# Patient Record
Sex: MALE | Race: WHITE | Employment: FULL TIME | URBAN - METROPOLITAN AREA
[De-identification: names, ages, dates, MRNs, and addresses within clinical notes are randomized per-mention and may not be internally consistent; named-entity substitution may affect disease eponyms.]

---

## 2018-08-03 ENCOUNTER — THERAPY VISIT (OUTPATIENT)
Dept: PHYSICAL THERAPY | Facility: CLINIC | Age: 28
End: 2018-08-03
Payer: COMMERCIAL

## 2018-08-03 DIAGNOSIS — M22.2X9 PATELLOFEMORAL PAIN SYNDROME: ICD-10-CM

## 2018-08-03 DIAGNOSIS — M25.561 KNEE PAIN, RIGHT: Primary | ICD-10-CM

## 2018-08-03 PROCEDURE — 97110 THERAPEUTIC EXERCISES: CPT | Mod: GP | Performed by: PHYSICAL THERAPIST

## 2018-08-03 PROCEDURE — 97161 PT EVAL LOW COMPLEX 20 MIN: CPT | Mod: GP | Performed by: PHYSICAL THERAPIST

## 2018-08-03 PROCEDURE — 97530 THERAPEUTIC ACTIVITIES: CPT | Mod: GP | Performed by: PHYSICAL THERAPIST

## 2018-08-03 ASSESSMENT — ACTIVITIES OF DAILY LIVING (ADL)
GO DOWN STAIRS: ACTIVITY IS MINIMALLY DIFFICULT
STAND: ACTIVITY IS NOT DIFFICULT
KNEE_ACTIVITY_OF_DAILY_LIVING_SCORE: 66.16
GIVING WAY, BUCKLING OR SHIFTING OF KNEE: THE SYMPTOM AFFECTS MY ACTIVITY SEVERELY
PAIN: THE SYMPTOM AFFECTS MY ACTIVITY SEVERELY
KNEEL ON THE FRONT OF YOUR KNEE: ACTIVITY IS SOMEWHAT DIFFICULT
WALK: ACTIVITY IS NOT DIFFICULT
HOW_WOULD_YOU_RATE_THE_OVERALL_FUNCTION_OF_YOUR_KNEE_DURING_YOUR_USUAL_DAILY_ACTIVITIES?: ABNORMAL
RISE FROM A CHAIR: ACTIVITY IS MINIMALLY DIFFICULT
STIFFNESS: I HAVE THE SYMPTOM BUT IT DOES NOT AFFECT MY ACTIVITY
SWELLING: NOT ANSWERED
LIMPING: THE SYMPTOM AFFECTS MY ACTIVITY SLIGHTLY
HOW_WOULD_YOU_RATE_THE_CURRENT_FUNCTION_OF_YOUR_KNEE_DURING_YOUR_USUAL_DAILY_ACTIVITIES_ON_A_SCALE_FROM_0_TO_100_WITH_100_BEING_YOUR_LEVEL_OF_KNEE_FUNCTION_PRIOR_TO_YOUR_INJURY_AND_0_BEING_THE_INABILITY_TO_PERFORM_ANY_OF_YOUR_USUAL_DAILY_ACTIVITIES?: 70
WEAKNESS: THE SYMPTOM AFFECTS MY ACTIVITY SEVERELY
AS_A_RESULT_OF_YOUR_KNEE_INJURY,_HOW_WOULD_YOU_RATE_YOUR_CURRENT_LEVEL_OF_DAILY_ACTIVITY?: ABNORMAL
KNEE_ACTIVITY_OF_DAILY_LIVING_SUM: 43
RAW_SCORE: 46.31
SQUAT: ACTIVITY IS SOMEWHAT DIFFICULT
SIT WITH YOUR KNEE BENT: ACTIVITY IS NOT DIFFICULT
GO UP STAIRS: ACTIVITY IS MINIMALLY DIFFICULT

## 2018-08-03 NOTE — LETTER
[facility]  [address] [address]  [phone]    2018    Re: Angelo Gamboa   :   1990  MRN:  [id number]   REFERRING PHYSICIAN:   Colleen Nash    [facility] PH[facility]ICAL [facility]    Date of Initial Evaluation:  18  Visits:  Rxs Used: 1  Reason for Referral:     Knee pain, right  Patellofemoral pain syndrome                 Physical Therapy Initial Evaluation  August 3, 2018  Precautions/Restrictions/MD instructions: PT eval and treat. 5-6 session. Follow up after if . renzo frazier, pelvifemoral biomechanics.    Subjective:   Date of Onset: High School. Increase in recent months. MD order on 18  C/C: right anterior knee pain, clicking and popping  Quality of pain is aching and clicking/popping. Pains are described as intermittent in nature. Pain is worse: on the go. Pain is rated 7/10.   History of symptoms: Pains began gradually as the result of maybe from cutting in sports in high school. Since onset, symptoms are same. Previous episodes:recurrent  Knee pain   Worsened by: running, sitting cross legged, landing on the knee, jumping, cutting, squatting and deadlifting  Alleviated by: Nothing.    General health as reported by patient: good  Pertinent medical/surgical history: overweight. He denies night pain, painful cough, painful peripheral motor deficit, recent bowel/bladder change, recent vision change, ringing in the ears or pain with swallowing, unexplained weight loss, significant current illness or recent hospital admission. He denies any regional implanted devices. He denies history of surgery in the area.  Imaging: x-ray. Current occupational status: . Patient's goals are: decrease pain, increase activity. Return to MD:  PRN.   Re: Angelo Gamboa   :   1990    Objective:  KNEE EVALUATION    PROM:   L  R   Hyperextension 1 1    Extension 0 0   Flexion 125 125       Strength:   L R   HIP     Flex 5/5 5/5   Ext 4+/5 4+/5   Abd 4+/5 4+/5   KNEE     Flex 5/5 5/5   Ext 5/5 5/5       Hip PROM:     L R   IR WNL WNL   ER WNL WNL   Flexion 125 125       Special tests:   L R   Anterior Drawer - -   Posterior Drawer - -   Lachman's - -   Valgus 0 degrees - -   Valgus 30 degrees - -   Varus 0 degrees - -   Varus 30 degrees - -   Vanessa's - -   Appley's - -   Lateral Compression - +   Patellar Compression - +       Palpation: TTP at medial border of patella and patellar fat pad of R knee  Patellar tracking: observable lateral path deviation with terminal knee extension, resolves with tape    Functional: squat with moderate valgus B, R>L  Gait: no apparent gait abnormality, pain free walking  Re: Angelo Contehrick Cooper   :   1990      Assessment/Plan:    The patient is a 28 year old male with chief complaint of right knee pain   The patient has the following significant findings with corresponding treatment plan.  Diagnosis 1:  Right patellofemoral pain  Pain -  hot/cold therapy, manual therapy, splint/taping/bracing/orthotics, self management, education, directional preference exercise and home program  Decreased ROM/flexibility - manual therapy and therapeutic exercise  Decreased strength - therapeutic exercise and therapeutic activities  Impaired balance - neuro re-education and therapeutic activities  Decreased proprioception - neuro re-education and therapeutic activities  Impaired muscle performance - neuro re-education  Decreased function - therapeutic activities  Impaired posture - neuro re-education      Therapy Evaluation Codes:   1) History comprised of:   Personal factors that impact the plan of care:      Age and Time since onset of symptoms.    Comorbidity factors that impact the plan of care are:      Overweight.     Medications impacting care: None.  2) Examination of Body Systems comprised of:   Body structures and  functions that impact the plan of care:      Knee.   Activity limitations that impact the plan of care are:      Driving, Jumping, Lifting, Running, Sitting, Sports, Squatting/kneeling and   Stairs.   Clinical presentation characteristics are:    Stable/Uncomplicated.  3) Presentation comprised of:   Presentation scored as Low complexity with uncomplicated characteristics..  4) Decision-Making    Low complexity using standardized patient assessment instrument and/or   measureable assessment of functional outcome.    Cumulative Therapy Evaluation is: Low complexity.    Previous and current functional limitations:  (See Goal Flow Sheet for this information)    Short term and Long term goals: (See Goal Flow Sheet for this information)     Communication ability:  Patient appears to be able to clearly communicate and understand verbal and written communication and follow directions correctly.  Treatment Explanation - The following has been discussed with the patient: RX ordered/plan of care, anticipated outcomes, and possible risks and side effects.  This patient would benefit from PT intervention to resume normal activities.   Rehab potential is good.      Re: Angelo Gamboa   :   1990    Frequency:  1 X week, once daily  Duration:  for 6 weeks  Discharge Plan: Achieve all LTGs, be independent in home treatment program, and reach maximal therapeutic benefit.    Please refer to the daily flowsheet for treatment today, total treatment time and time spent performing 1:1 timed codes.         Thank you for your referral.    INQUIRIES  Therapist: Lanette Fay DPT  [facility] PHYSICAL THERAPY  [address] [place] [id number]  Phone: [phone]  Fax: [phone]

## 2018-08-03 NOTE — MR AVS SNAPSHOT
After Visit Summary   8/3/2018    Angelo Gamboa    MRN: [id number]           Patient Information     Date Of Birth          1990        Visit Information        Provider Department      8/3/2018 4:00 PM Lanette Fay, ERIC [facility]        Today's Diagnoses     Knee pain, right    -  1    Patellofemoral pain syndrome           Follow-ups after your visit        Your next 10 appointments already scheduled     Aug 09, 2018  8:10 AM CDT   KEISAH Extremity with Lanette Fay PT   [facility] Physical Therapy ([facility]  )    [address] [place] [id number]   [phone]            Aug 17, 2018  7:30 AM CDT   KEISHA Extremity with Lanette Fay PT   [facility] ([facility]  )    [address] [place] [id number] [phone]              Who to contact     If you have questions or need follow up information about today's clinic visit or your schedule please contact [facility] PHYSICAL THERAPY directly at [phone].  Normal or non-critical lab and imaging results will be communicated to you by MyChart, letter or phone within 4 business days after the clinic has received the results. If you do not hear from us within 7 days, please contact the clinic through [employer] or phone. If you have a critical or abnormal lab result, we will notify you by phone as soon as possible.  Submit refill requests through [employer] or call your pharmacy and they will forward the refill request to us. Please allow 3 business days for your refill to be completed.          Additional Information About Your Visit        MyChart Information     [employer] lets you send messages to your doctor, view your test results, renew your prescriptions, schedule appointments and more. To sign up, go to  [email or web address] . Click on \"Log in\" on the left side of the screen, which will take you to the Welcome page. Then click on \"Sign up Now\" on the right side of the page.     You will be asked to enter the access code listed below, as well as some personal information. Please follow the directions to create your username and password.     Your access code is: [email or web address]  Expires: 2018  5:54 PM     Your access code will  in 90 days. If you need help or a new code, please call your [place] clinic or [phone].        Care EveryWhere ID     This is your Care EveryWhere ID. This could be used by other organizations to access your [place] medical records  [id number]         Blood Pressure from Last 3 Encounters:   No data found for BP    Weight from Last 3 Encounters:   No data found for Wt              We Performed the Following     HC PT EVAL, LOW COMPLEXITY     KEISHA INITIAL EVAL REPORT     THERAPEUTIC ACTIVITIES     THERAPEUTIC EXERCISES        Primary Care Provider    None Specified       No primary provider on file.        Equal Access to Services     [facility]: Hadii gabi Sanchez, waaxda lukrissy, qaybta kaalmawang arevalo, jo ann almodovar . So [facility] [phone].    ATENCIÓN: Si habla español, tiene a bahena disposición servicios gratuitos de asistencia lingüística. Llame al [phone].    We comply with applicable federal civil rights laws and [place] laws. We do not discriminate on the basis of race, color, national origin, age, disability, sex, sexual orientation, or gender identity.            Thank you!     Thank you for choosing [facility] PHYSICAL THERAPY  for your care. Our goal is always to provide you with excellent care. Hearing back from our patients is one way we can continue to improve our services. Please take a few minutes to complete the written survey that you may receive in the mail after your " visit with us. Thank you!             Your Updated Medication List - Protect others around you: Learn how to safely use, store and throw away your medicines at www.disposemymeds.org.      Notice  As of 8/3/2018 11:59 PM    You have not been prescribed any medications.

## 2018-08-03 NOTE — PROGRESS NOTES
[facility] Initial Evaluation  Subjective:  [facility]                  Physical Therapy Initial Evaluation  August 3, 2018     Precautions/Restrictions/MD instructions: PT eval and treat. 5-6 session. Follow up after if . renzo frazier, pelvifemoral biomechanics.    Subjective:   Date of Onset: High School. Increase in recent months. MD order on 7/30/18  C/C: right anterior knee pain, clicking and popping  Quality of pain is aching and clicking/popping. Pains are described as intermittent in nature. Pain is worse: on the go. Pain is rated 7/10.   History of symptoms: Pains began gradually as the result of maybe from cutting in sports in high school. Since onset, symptoms are same. Previous episodes:recurrent  Knee pain   Worsened by: running, sitting cross legged, landing on the knee, jumping, cutting, squatting and deadlifting  Alleviated by: Nothing.    General health as reported by patient: good  Pertinent medical/surgical history: overweight. He denies night pain, painful cough, painful peripheral motor deficit, recent bowel/bladder change, recent vision change, ringing in the ears or pain with swallowing, unexplained weight loss, significant current illness or recent hospital admission. He denies any regional implanted devices. He denies history of surgery in the area.    Imaging: x-ray. Current occupational status: . Patient's goals are: decrease pain, increase activity. Return to MD:  PRN.     Objective:  KNEE:    PROM:   L  R   Hyperextension 1 1   Extension 0 0   Flexion 125 125     Strength:   L R   HIP     Flex 5/5 5/5   Ext 4+/5 4+/5   Abd 4+/5 4+/5   KNEE     Flex 5/5 5/5   Ext 5/5 5/5     Hip PROM:     L R   IR WNL WNL   ER WNL WNL   Flexion 125 125       Special tests:   L R   Anterior Drawer - -   Posterior Drawer - -   Lachman's - -   Valgus 0 degrees - -   Valgus 30 degrees - -   Varus 0 degrees - -   Varus 30 degrees - -   Vanessa's - -   Appley's - -   Lateral Compression  - +   Patellar Compression - +       Palpation: TTP at medial border of patella and patellar fat pad of R knee  Patellar tracking: observable lateral path deviation with terminal knee extension, resolves with tape    Functional: squat with moderate valgus B, R>L    Gait: no apparent gait abnormality, pain free walking          Assessment/Plan:    The patient is a 28 year old male with chief complaint of right knee pain   The patient has the following significant findings with corresponding treatment plan.  Diagnosis 1:  Right patellofemoral pain  Pain -  hot/cold therapy, manual therapy, splint/taping/bracing/orthotics, self management, education, directional preference exercise and home program  Decreased ROM/flexibility - manual therapy and therapeutic exercise  Decreased strength - therapeutic exercise and therapeutic activities  Impaired balance - neuro re-education and therapeutic activities  Decreased proprioception - neuro re-education and therapeutic activities  Impaired muscle performance - neuro re-education  Decreased function - therapeutic activities  Impaired posture - neuro re-education    Therapy Evaluation Codes:   1) History comprised of:   Personal factors that impact the plan of care:      Age and Time since onset of symptoms.    Comorbidity factors that impact the plan of care are:      Overweight.     Medications impacting care: None.  2) Examination of Body Systems comprised of:   Body structures and functions that impact the plan of care:      Knee.   Activity limitations that impact the plan of care are:      Driving, Jumping, Lifting, Running, Sitting, Sports, Squatting/kneeling and Stairs.   Clinical presentation characteristics are:    Stable/Uncomplicated.  3) Presentation comprised of:   Presentation scored as Low complexity with uncomplicated characteristics..  4) Decision-Making    Low complexity using standardized patient assessment instrument and/or measureable assessment of  functional outcome.  Cumulative Therapy Evaluation is: Low complexity.    Previous and current functional limitations:  (See Goal Flow Sheet for this information)    Short term and Long term goals: (See Goal Flow Sheet for this information)     Communication ability:  Patient appears to be able to clearly communicate and understand verbal and written communication and follow directions correctly.  Treatment Explanation - The following has been discussed with the patient: RX ordered/plan of care, anticipated outcomes, and possible risks and side effects.  This patient would benefit from PT intervention to resume normal activities.   Rehab potential is good.    Frequency:  1 X week, once daily  Duration:  for 6 weeks  Discharge Plan: Achieve all LTGs, be independent in home treatment program, and reach maximal therapeutic benefit.    Please refer to the daily flowsheet for treatment today, total treatment time and time spent performing 1:1 timed codes.       Objective:  System    Physical Exam    General     ROS

## 2018-08-09 ENCOUNTER — THERAPY VISIT (OUTPATIENT)
Dept: PHYSICAL THERAPY | Facility: CLINIC | Age: 28
End: 2018-08-09
Payer: COMMERCIAL

## 2018-08-09 DIAGNOSIS — M22.2X9 PATELLOFEMORAL PAIN SYNDROME: ICD-10-CM

## 2018-08-09 DIAGNOSIS — M25.561 KNEE PAIN, RIGHT: ICD-10-CM

## 2018-08-09 PROCEDURE — 97112 NEUROMUSCULAR REEDUCATION: CPT | Mod: GP | Performed by: PHYSICAL THERAPIST

## 2018-08-09 PROCEDURE — 97110 THERAPEUTIC EXERCISES: CPT | Mod: GP | Performed by: PHYSICAL THERAPIST

## 2018-08-17 ENCOUNTER — THERAPY VISIT (OUTPATIENT)
Dept: PHYSICAL THERAPY | Facility: CLINIC | Age: 28
End: 2018-08-17
Payer: COMMERCIAL

## 2018-08-17 DIAGNOSIS — M25.561 KNEE PAIN, RIGHT: ICD-10-CM

## 2018-08-17 DIAGNOSIS — M22.2X9 PATELLOFEMORAL PAIN SYNDROME: ICD-10-CM

## 2018-08-17 PROCEDURE — 97110 THERAPEUTIC EXERCISES: CPT | Mod: GP | Performed by: PHYSICAL THERAPIST

## 2018-08-17 PROCEDURE — 97112 NEUROMUSCULAR REEDUCATION: CPT | Mod: GP | Performed by: PHYSICAL THERAPIST

## 2018-08-28 ENCOUNTER — THERAPY VISIT (OUTPATIENT)
Dept: PHYSICAL THERAPY | Facility: CLINIC | Age: 28
End: 2018-08-28
Payer: COMMERCIAL

## 2018-08-28 DIAGNOSIS — M22.2X9 PATELLOFEMORAL PAIN SYNDROME: ICD-10-CM

## 2018-08-28 DIAGNOSIS — M25.561 KNEE PAIN, RIGHT: ICD-10-CM

## 2018-08-28 PROCEDURE — 97112 NEUROMUSCULAR REEDUCATION: CPT | Mod: GP | Performed by: PHYSICAL THERAPIST

## 2018-08-28 PROCEDURE — 97110 THERAPEUTIC EXERCISES: CPT | Mod: GP | Performed by: PHYSICAL THERAPIST

## 2018-09-27 ENCOUNTER — THERAPY VISIT (OUTPATIENT)
Dept: PHYSICAL THERAPY | Facility: CLINIC | Age: 28
End: 2018-09-27
Payer: COMMERCIAL

## 2018-09-27 DIAGNOSIS — M25.561 KNEE PAIN, RIGHT: ICD-10-CM

## 2018-09-27 DIAGNOSIS — M22.2X9 PATELLOFEMORAL PAIN SYNDROME: ICD-10-CM

## 2018-09-27 PROCEDURE — 97112 NEUROMUSCULAR REEDUCATION: CPT | Mod: GP | Performed by: PHYSICAL THERAPIST

## 2018-09-27 PROCEDURE — 97110 THERAPEUTIC EXERCISES: CPT | Mod: GP | Performed by: PHYSICAL THERAPIST

## 2018-09-27 PROCEDURE — 97530 THERAPEUTIC ACTIVITIES: CPT | Mod: GP | Performed by: PHYSICAL THERAPIST

## 2018-09-27 NOTE — MR AVS SNAPSHOT
After Visit Summary   9/27/2018    Angelo Gamboa    MRN: [id number]           Patient Information     Date Of Birth          1990        Visit Information        Provider Department      9/27/2018 4:40 PM Lanette Fay PT [facility] Physical [facility]        Today's Diagnoses     Patellofemoral pain syndrome        Knee pain, right           Follow-ups after your visit        Who to contact     If you have questions or need follow up information about today's clinic visit or your schedule please contact [facility]THLET[facility] PHYSICAL [facility] directly at [phone].  Normal or non-critical lab and imaging results will be communicated to you by MyChart, letter or phone within 4 business days after the clinic has received the results. If you do not hear from us within 7 days, please contact the clinic through MyChart or phone. If you have a critical or abnormal lab result, we will notify you by phone as soon as possible.  Submit refill requests through [employer] or call your pharmacy and they will forward the refill request to us. Please allow 3 business days for your refill to be completed.          Additional Information About Your Visit        Care EveryWhere ID     This is your Care EveryWhere ID. This could be used by other organizations to access your [place] medical records  [id number]         Blood Pressure from Last 3 Encounters:   No data found for BP    Weight from Last 3 Encounters:   No data found for Wt              We Performed the Following     NEUROMUSCULAR RE-EDUCATION     THERAPEUTIC ACTIVITIES     THERAPEUTIC EXERCISES        Primary Care Provider Fax #    Physician No Ref-Primary [phone]       No address on file        Equal Access to Services     JOAN CHONG : Barbara Sanchez, meagan deal, jo ann madera denzoë thompson. So [facility]  [phone].    ATENCIÓN: Si habla wendy, tiene a bahena disposición servicios gratuitos de asistencia lingüística. Aroldo al [phone].    We comply with applicable federal civil rights laws and [place] laws. We do not discriminate on the basis of race, color, national origin, age, disability, sex, sexual orientation, or gender identity.            Thank you!     Thank you for choosing [facility] PHYSICAL [facility]  for your care. Our goal is always to provide you with excellent care. Hearing back from our patients is one way we can continue to improve our services. Please take a few minutes to complete the written survey that you may receive in the mail after your visit with us. Thank you!             Your Updated Medication List - Protect others around you: Learn how to safely use, store and throw away your medicines at www.disposemymeds.org.      Notice  As of 9/27/2018  6:17 PM    You have not been prescribed any medications.

## 2019-04-18 PROBLEM — M25.561 KNEE PAIN, RIGHT: Status: RESOLVED | Noted: 2018-08-03 | Resolved: 2019-04-18

## 2019-04-18 PROBLEM — M22.2X9 PATELLOFEMORAL PAIN SYNDROME: Status: RESOLVED | Noted: 2018-08-03 | Resolved: 2019-04-18

## 2019-04-18 NOTE — PROGRESS NOTES
"Discharge Note    Progress reporting period is from last progress note on Sep 27, 2018.    Ali failed to follow up and current status is unknown.  Please see information below for last relevant information on current status.  Patient seen for 5 visits.    SUBJECTIVE  Subjective changes noted by patient:  Pt reports low grade soreness at the anterior knee after cardio activity and in the morning. Feels like his knee cap still catches, clicks and locks with deep squatting, going up stairs. He feels this is redued when his hips are \"unlocked\"   .  Current pain level is 3/10(with squats and stairs only, no pain at rest).     Previous pain level was  7/10.   Changes in function:  Yes (See Goal flowsheet attached for changes in current functional level)  Adverse reaction to treatment or activity: None    OBJECTIVE  Changes noted in objective findings: Pain free R ROM today. Tolerates PT without KT with no increase in pain. Min Valgus and increased lateral translation on stairs with clicking. Improves with cues to drive knees and soft landing. Tolerates treadmill running x 1 mile at 6.5 MPH wthout pain     ASSESSMENT/PLAN  Diagnosis: right knee pain   Updated problem list and treatment plan:   Decreased function - HEP  Decreased strength - HEP  Impaired muscle performance - HEP  Decreased proprioception - HEP  STG/LTGs have been met or progress has been made towards goals:  Yes, please see goal flowsheet for most current information  Assessment of Progress: current status is unknown.    Last current status: Pt is progressing as expected   Self Management Plans:  HEP  I have re-evaluated this patient and find that the nature, scope, duration and intensity of the therapy is appropriate for the medical condition of the patient.  Ali continues to require the following intervention to meet STG and LTG's:  HEP.    Recommendations:  Discharge with current home program.  Patient to follow up with MD as needed.    Please refer to " the daily flowsheet for treatment today, total treatment time and time spent performing 1:1 timed codes.